# Patient Record
Sex: MALE | Race: OTHER | HISPANIC OR LATINO | Employment: FULL TIME | URBAN - NONMETROPOLITAN AREA
[De-identification: names, ages, dates, MRNs, and addresses within clinical notes are randomized per-mention and may not be internally consistent; named-entity substitution may affect disease eponyms.]

---

## 2020-09-28 ENCOUNTER — HOSPITAL ENCOUNTER (EMERGENCY)
Facility: HOSPITAL | Age: 35
Discharge: HOME | End: 2020-09-28
Attending: EMERGENCY MEDICINE | Admitting: EMERGENCY MEDICINE
Payer: MEDICARE

## 2020-09-28 VITALS
TEMPERATURE: 97.1 F | SYSTOLIC BLOOD PRESSURE: 112 MMHG | HEART RATE: 82 BPM | WEIGHT: 156.31 LBS | OXYGEN SATURATION: 100 % | RESPIRATION RATE: 18 BRPM | DIASTOLIC BLOOD PRESSURE: 65 MMHG

## 2020-09-28 DIAGNOSIS — R51.9 HEADACHE: Primary | ICD-10-CM

## 2020-09-28 LAB
ANION GAP SERPL CALCULATED.3IONS-SCNC: 8 MMOL/L (ref 4–13)
APTT PPP: 27 SECONDS (ref 23–37)
BASOPHILS # BLD AUTO: 0.05 THOUSANDS/ΜL (ref 0–0.1)
BASOPHILS NFR BLD AUTO: 1 % (ref 0–1)
BUN SERPL-MCNC: 11 MG/DL (ref 5–25)
CALCIUM SERPL-MCNC: 8.9 MG/DL (ref 8.3–10.1)
CHLORIDE SERPL-SCNC: 100 MMOL/L (ref 100–108)
CO2 SERPL-SCNC: 32 MMOL/L (ref 21–32)
CREAT SERPL-MCNC: 0.94 MG/DL (ref 0.6–1.3)
CRP SERPL QL: <0.5 MG/L
EOSINOPHIL # BLD AUTO: 0.2 THOUSAND/ΜL (ref 0–0.61)
EOSINOPHIL NFR BLD AUTO: 3 % (ref 0–6)
ERYTHROCYTE [DISTWIDTH] IN BLOOD BY AUTOMATED COUNT: 11.9 % (ref 11.6–15.1)
ERYTHROCYTE [SEDIMENTATION RATE] IN BLOOD: 4 MM/HOUR (ref 0–14)
GFR SERPL CREATININE-BSD FRML MDRD: 105 ML/MIN/1.73SQ M
GLUCOSE SERPL-MCNC: 117 MG/DL (ref 65–140)
HCT VFR BLD AUTO: 45.7 % (ref 36.5–49.3)
HGB BLD-MCNC: 15.8 G/DL (ref 12–17)
IMM GRANULOCYTES # BLD AUTO: 0.02 THOUSAND/UL (ref 0–0.2)
IMM GRANULOCYTES NFR BLD AUTO: 0 % (ref 0–2)
INR PPP: 1.01 (ref 0.84–1.19)
LYMPHOCYTES # BLD AUTO: 2.19 THOUSANDS/ΜL (ref 0.6–4.47)
LYMPHOCYTES NFR BLD AUTO: 31 % (ref 14–44)
MCH RBC QN AUTO: 30.4 PG (ref 26.8–34.3)
MCHC RBC AUTO-ENTMCNC: 34.6 G/DL (ref 31.4–37.4)
MCV RBC AUTO: 88 FL (ref 82–98)
MONOCYTES # BLD AUTO: 0.73 THOUSAND/ΜL (ref 0.17–1.22)
MONOCYTES NFR BLD AUTO: 10 % (ref 4–12)
NEUTROPHILS # BLD AUTO: 3.97 THOUSANDS/ΜL (ref 1.85–7.62)
NEUTS SEG NFR BLD AUTO: 55 % (ref 43–75)
NRBC BLD AUTO-RTO: 0 /100 WBCS
PLATELET # BLD AUTO: 287 THOUSANDS/UL (ref 149–390)
PMV BLD AUTO: 9.4 FL (ref 8.9–12.7)
POTASSIUM SERPL-SCNC: 3.8 MMOL/L (ref 3.5–5.3)
PROTHROMBIN TIME: 13.1 SECONDS (ref 11.6–14.5)
RBC # BLD AUTO: 5.2 MILLION/UL (ref 3.88–5.62)
SODIUM SERPL-SCNC: 140 MMOL/L (ref 136–145)
WBC # BLD AUTO: 7.16 THOUSAND/UL (ref 4.31–10.16)

## 2020-09-28 PROCEDURE — 85610 PROTHROMBIN TIME: CPT | Performed by: PHYSICIAN ASSISTANT

## 2020-09-28 PROCEDURE — 85025 COMPLETE CBC W/AUTO DIFF WBC: CPT | Performed by: PHYSICIAN ASSISTANT

## 2020-09-28 PROCEDURE — 85730 THROMBOPLASTIN TIME PARTIAL: CPT | Performed by: PHYSICIAN ASSISTANT

## 2020-09-28 PROCEDURE — 96374 THER/PROPH/DIAG INJ IV PUSH: CPT

## 2020-09-28 PROCEDURE — 80048 BASIC METABOLIC PNL TOTAL CA: CPT | Performed by: PHYSICIAN ASSISTANT

## 2020-09-28 PROCEDURE — 96361 HYDRATE IV INFUSION ADD-ON: CPT

## 2020-09-28 PROCEDURE — 36415 COLL VENOUS BLD VENIPUNCTURE: CPT | Performed by: PHYSICIAN ASSISTANT

## 2020-09-28 PROCEDURE — 96375 TX/PRO/DX INJ NEW DRUG ADDON: CPT

## 2020-09-28 PROCEDURE — 86140 C-REACTIVE PROTEIN: CPT | Performed by: PHYSICIAN ASSISTANT

## 2020-09-28 PROCEDURE — 99283 EMERGENCY DEPT VISIT LOW MDM: CPT

## 2020-09-28 PROCEDURE — 99285 EMERGENCY DEPT VISIT HI MDM: CPT | Performed by: PHYSICIAN ASSISTANT

## 2020-09-28 PROCEDURE — 85652 RBC SED RATE AUTOMATED: CPT | Performed by: PHYSICIAN ASSISTANT

## 2020-09-28 RX ORDER — DIPHENHYDRAMINE HYDROCHLORIDE 50 MG/ML
25 INJECTION INTRAMUSCULAR; INTRAVENOUS ONCE
Status: COMPLETED | OUTPATIENT
Start: 2020-09-28 | End: 2020-09-28

## 2020-09-28 RX ORDER — KETOROLAC TROMETHAMINE 30 MG/ML
15 INJECTION, SOLUTION INTRAMUSCULAR; INTRAVENOUS ONCE
Status: COMPLETED | OUTPATIENT
Start: 2020-09-28 | End: 2020-09-28

## 2020-09-28 RX ORDER — METOCLOPRAMIDE HYDROCHLORIDE 5 MG/ML
10 INJECTION INTRAMUSCULAR; INTRAVENOUS ONCE
Status: COMPLETED | OUTPATIENT
Start: 2020-09-28 | End: 2020-09-28

## 2020-09-28 RX ADMIN — KETOROLAC TROMETHAMINE 15 MG: 30 INJECTION, SOLUTION INTRAMUSCULAR at 12:50

## 2020-09-28 RX ADMIN — METOCLOPRAMIDE HYDROCHLORIDE 10 MG: 5 INJECTION INTRAMUSCULAR; INTRAVENOUS at 12:50

## 2020-09-28 RX ADMIN — DIPHENHYDRAMINE HYDROCHLORIDE 25 MG: 50 INJECTION, SOLUTION INTRAMUSCULAR; INTRAVENOUS at 12:50

## 2020-09-28 RX ADMIN — SODIUM CHLORIDE 1000 ML: 0.9 INJECTION, SOLUTION INTRAVENOUS at 12:49

## 2022-05-22 ENCOUNTER — HOSPITAL ENCOUNTER (EMERGENCY)
Facility: HOSPITAL | Age: 37
Discharge: HOME | End: 2022-05-22
Attending: EMERGENCY MEDICINE | Admitting: EMERGENCY MEDICINE
Payer: MEDICARE

## 2022-05-22 ENCOUNTER — APPOINTMENT (EMERGENCY)
Dept: RADIOLOGY | Facility: HOSPITAL | Age: 37
End: 2022-05-22
Payer: MEDICARE

## 2022-05-22 VITALS
TEMPERATURE: 97.2 F | WEIGHT: 150.79 LBS | DIASTOLIC BLOOD PRESSURE: 72 MMHG | RESPIRATION RATE: 20 BRPM | HEART RATE: 59 BPM | SYSTOLIC BLOOD PRESSURE: 111 MMHG | OXYGEN SATURATION: 98 %

## 2022-05-22 DIAGNOSIS — T07.XXXA MULTIPLE ABRASIONS: Primary | ICD-10-CM

## 2022-05-22 DIAGNOSIS — T07.XXXA MULTIPLE CONTUSIONS: ICD-10-CM
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
## 2022-05-22 PROCEDURE — 73564 X-RAY EXAM KNEE 4 OR MORE: CPT

## 2022-05-22 PROCEDURE — 73110 X-RAY EXAM OF WRIST: CPT

## 2022-05-22 PROCEDURE — 73080 X-RAY EXAM OF ELBOW: CPT

## 2022-05-22 PROCEDURE — 73610 X-RAY EXAM OF ANKLE: CPT

## 2022-05-22 PROCEDURE — 99284 EMERGENCY DEPT VISIT MOD MDM: CPT | Performed by: EMERGENCY MEDICINE

## 2022-05-22 PROCEDURE — 99284 EMERGENCY DEPT VISIT MOD MDM: CPT
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
## 2024-09-20 ENCOUNTER — HOSPITAL ENCOUNTER (EMERGENCY)
Facility: HOSPITAL | Age: 39
Discharge: HOME | End: 2024-09-20
Attending: EMERGENCY MEDICINE
Payer: MEDICARE

## 2024-09-20 VITALS
TEMPERATURE: 97.7 F | OXYGEN SATURATION: 99 % | DIASTOLIC BLOOD PRESSURE: 97 MMHG | SYSTOLIC BLOOD PRESSURE: 121 MMHG | HEART RATE: 65 BPM | RESPIRATION RATE: 18 BRPM

## 2024-09-20 DIAGNOSIS — K04.7 DENTAL INFECTION: Primary | ICD-10-CM

## 2024-09-20 PROCEDURE — 99284 EMERGENCY DEPT VISIT MOD MDM: CPT | Performed by: EMERGENCY MEDICINE

## 2024-09-20 PROCEDURE — 99282 EMERGENCY DEPT VISIT SF MDM: CPT

## 2024-09-20 RX ORDER — PENICILLIN V POTASSIUM 500 MG/1
500 TABLET, FILM COATED ORAL 4 TIMES DAILY
Qty: 28 TABLET | Refills: 0 | Status: SHIPPED | OUTPATIENT
Start: 2024-09-20 | End: 2024-09-27

## 2024-09-20 RX ORDER — NAPROXEN 500 MG/1
500 TABLET ORAL 2 TIMES DAILY PRN
Qty: 30 TABLET | Refills: 0 | Status: SHIPPED | OUTPATIENT
Start: 2024-09-20

## 2024-09-20 NOTE — ED PROVIDER NOTES
1. Dental infection      ED Disposition       ED Disposition   Discharge    Condition   Stable    Date/Time   Fri Sep 20, 2024 11:44 AM    Comment   Jagdish Wheeler discharge to home/self care.                   Assessment & Plan       Medical Decision Making  Problems Addressed:  Dental infection: self-limited or minor problem    Risk  Prescription drug management.                     Medications - No data to display    History of Present Illness       This is a 39-year-old male presenting the emergency room with chief complaint of right facial swelling and dental pain.  Patient reports that he had the discomfort ongoing for about a week and getting progressively worse.  Pain radiates slightly into his right jaw and behind his right ear.  Has not received any relief from over-the-counter medications.      History provided by:  Patient  Dental Pain  Location:  Upper  Quality:  Aching      Review of Systems        Objective     ED Triage Vitals   Temperature Pulse Blood Pressure Respirations SpO2 Patient Position - Orthostatic VS   09/20/24 1110 09/20/24 1110 09/20/24 1110 09/20/24 1110 09/20/24 1110 09/20/24 1110   97.7 °F (36.5 °C) 65 121/97 18 99 % Sitting      Temp Source Heart Rate Source BP Location FiO2 (%) Pain Score    09/20/24 1110 09/20/24 1110 09/20/24 1110 -- 09/20/24 1145    Temporal Monitor Left arm  7        Physical Exam    Labs Reviewed - No data to display  No orders to display       Procedures    ED Medication and Procedure Management   None     Discharge Medication List as of 9/20/2024 11:46 AM        START taking these medications    Details   naproxen (NAPROSYN) 500 mg tablet Take 1 tablet (500 mg total) by mouth 2 (two) times a day as needed for mild pain or moderate pain, Starting Fri 9/20/2024, Normal      penicillin V potassium (VEETID) 500 mg tablet Take 1 tablet (500 mg total) by mouth 4 (four) times a day for 7 days, Starting Fri 9/20/2024, Until Fri 9/27/2024, Normal                 Michael Amezcua,   09/20/24 1605

## 2024-09-20 NOTE — DISCHARGE INSTRUCTIONS
Here is a list of  dental clinics that may be able to help you.  Keep in mind that these clinics do not have to see you or any other patient.  Also, these clinics are not connected to the [facility] or [facility] but if they agree to see you as a patient it is easy for them to call  Medical Records Department to have your records faxed to them.      [facility]:  [address]   [address]   [facility] [place] [facility] [address]   [phone]     [facility]:   [address]   [address]   [place], [place] [id number] [phone]     [facility]:  [address] [place] [address] [phone]    [facility]:  [address] [place] [address] [phone]    [facility]:  [address],  [place] [address] [phone]    [facility]:  [address] [place] [address]  [phone]    [facility]:  [address] [place] [address]  [phone]    [facility]:  [address] [id number] [phone]    [facility]:  [address] [place], [place] [address] [phone]    [facility]  [address], [place] [address]  [phone]    [facility]:  [address] [place] [id number] [phone]     [facility]:  [address] [place] [address] [phone]       
No lesions; no rash

## 2025-03-10 ENCOUNTER — HOSPITAL ENCOUNTER (EMERGENCY)
Facility: HOSPITAL | Age: 40
Discharge: HOME | End: 2025-03-10
Attending: EMERGENCY MEDICINE
Payer: MEDICARE

## 2025-03-10 ENCOUNTER — APPOINTMENT (EMERGENCY)
Dept: RADIOLOGY | Facility: HOSPITAL | Age: 40
End: 2025-03-10
Payer: MEDICARE

## 2025-03-10 VITALS
SYSTOLIC BLOOD PRESSURE: 130 MMHG | HEART RATE: 68 BPM | OXYGEN SATURATION: 100 % | DIASTOLIC BLOOD PRESSURE: 86 MMHG | RESPIRATION RATE: 16 BRPM | TEMPERATURE: 97.5 F

## 2025-03-10 DIAGNOSIS — S91.339A PUNCTURE WOUND OF FOOT: Primary | ICD-10-CM

## 2025-03-10 PROCEDURE — 73630 X-RAY EXAM OF FOOT: CPT

## 2025-03-10 PROCEDURE — 90715 TDAP VACCINE 7 YRS/> IM: CPT | Performed by: EMERGENCY MEDICINE

## 2025-03-10 PROCEDURE — 90471 IMMUNIZATION ADMIN: CPT

## 2025-03-10 PROCEDURE — 99284 EMERGENCY DEPT VISIT MOD MDM: CPT | Performed by: PHYSICIAN ASSISTANT

## 2025-03-10 PROCEDURE — 99283 EMERGENCY DEPT VISIT LOW MDM: CPT

## 2025-03-10 RX ORDER — CIPROFLOXACIN 500 MG/1
500 TABLET, FILM COATED ORAL 2 TIMES DAILY
Qty: 14 TABLET | Refills: 0 | Status: SHIPPED | OUTPATIENT
Start: 2025-03-10 | End: 2025-03-17

## 2025-03-10 RX ORDER — CIPROFLOXACIN 500 MG/1
500 TABLET, FILM COATED ORAL ONCE
Status: COMPLETED | OUTPATIENT
Start: 2025-03-10 | End: 2025-03-10

## 2025-03-10 RX ADMIN — TETANUS TOXOID, REDUCED DIPHTHERIA TOXOID AND ACELLULAR PERTUSSIS VACCINE, ADSORBED 0.5 ML: 5; 2.5; 8; 8; 2.5 SUSPENSION INTRAMUSCULAR at 12:51

## 2025-03-10 RX ADMIN — CIPROFLOXACIN 500 MG: 500 TABLET ORAL at 13:07

## 2025-03-10 NOTE — DISCHARGE INSTRUCTIONS
Rest, ice, elevate.alterate between tylenol and motrin.   Take antibiotics as prescribed to prevent infection  Follow up with your PCP and return with any new or worsening symptoms

## 2025-03-10 NOTE — Clinical Note
Jagdish Wheeler was seen and treated in our emergency department on 3/10/2025.                Diagnosis:     Jagdish  may return to work on return date.    He may return on this date: 03/11/2025         If you have any questions or concerns, please don't hesitate to call.      Marielena Blake PA-C    ______________________________           _______________          _______________  Hospital Representative                              Date                                Time

## 2025-03-10 NOTE — ED PROVIDER NOTES
Time reflects when diagnosis was documented in both MDM as applicable and the Disposition within this note       Time User Action Codes Description Comment    3/10/2025  1:00 PM JulissaMarielena zimmerman Add [S91.339A] Puncture wound of foot           ED Disposition       ED Disposition   Discharge    Condition   Stable    Date/Time   Mon Mar 10, 2025  1:00 PM    Comment   Jagdish Wheeler discharge to home/self care.                   Assessment & Plan       Medical Decision Making  39-year-old male presents to the emergency department for evaluation status post nail puncture wound into the right great toe.  Vitals and medical record reviewed.  Patient at risk for the following but not limited to wound, cellulitis, embedded foreign body.  Sickle exam was consistent with puncture wound.  No palpable or visible foreign bodies.  ED interpretation of x-ray is negative for evidence of foreign body.  Patient was treated with tetanus, antibiotics and symptomatic treatments.  We discussed strict return precautions appropriate follow-up and patient verbalized understanding.  He is clinically and hemodynamically stable for discharge    Problems Addressed:  Puncture wound of foot: acute illness or injury    Amount and/or Complexity of Data Reviewed  Radiology: ordered.    Risk  Prescription drug management.             Medications   tetanus-diphtheria-acellular pertussis (BOOSTRIX) IM injection 0.5 mL (0.5 mL Intramuscular Given 3/10/25 1251)   ciprofloxacin (CIPRO) tablet 500 mg (500 mg Oral Given 3/10/25 1307)       ED Risk Strat Scores                                                History of Present Illness       Chief Complaint   Patient presents with    Foot Injury     Patient stepped on a nail injuring his right foot last night. Patient is not UTD with tetanus.        Past Medical History:   Diagnosis Date    Bipolar 1 disorder (HCC)     Depression     Frequent headaches     Psychiatric disorder       History reviewed. No  pertinent surgical history.   History reviewed. No pertinent family history.   Social History     Tobacco Use    Smoking status: Never    Smokeless tobacco: Never   Vaping Use    Vaping status: Never Used   Substance Use Topics    Alcohol use: Yes    Drug use: Not Currently      E-Cigarette/Vaping    E-Cigarette Use Never User       E-Cigarette/Vaping Substances      I have reviewed and agree with the history as documented.     39-year-old male presents to the emergency department for evaluation of right foot injury.  Patient states last night he stepped on a plank that had a nail on it.  States the nail went through his boot and into the bottom part of his right great toe.  States now he has discomfort in the area and some mild swelling.  States nail is intact.  Unknown last tetanus        Review of Systems   Constitutional: Negative.    Respiratory: Negative.     Cardiovascular: Negative.    Musculoskeletal: Negative.    Skin:  Positive for wound.   Neurological: Negative.    All other systems reviewed and are negative.          Objective       ED Triage Vitals [03/10/25 1105]   Temperature Pulse Blood Pressure Respirations SpO2 Patient Position - Orthostatic VS   97.5 °F (36.4 °C) 68 130/86 16 100 % Sitting      Temp Source Heart Rate Source BP Location FiO2 (%) Pain Score    Temporal Monitor Left arm -- --      Vitals      Date and Time Temp Pulse SpO2 Resp BP Pain Score FACES Pain Rating User   03/10/25 1105 97.5 °F (36.4 °C) 68 100 % 16 130/86 -- -- AFG            Physical Exam  Vitals and nursing note reviewed.   Constitutional:       General: He is not in acute distress.     Appearance: Normal appearance. He is not ill-appearing, toxic-appearing or diaphoretic.   HENT:      Head: Normocephalic.   Eyes:      Conjunctiva/sclera: Conjunctivae normal.   Pulmonary:      Effort: Pulmonary effort is normal.   Skin:     General: Skin is warm and dry.      Comments: Pinpoint puncture wound to the plantar aspect of  the right great toe.  No palpable or visible foreign bodies   Neurological:      General: No focal deficit present.      Mental Status: He is alert.         Results Reviewed       None            XR foot 3+ views RIGHT   Final Interpretation by Richard Mcdaniel MD (03/10 1441)      No evidence of fracture or radiopaque foreign body.         Computerized Assisted Algorithm (CAA) may have been used to analyze all applicable images.         Workstation performed: [id number]             Procedures    ED Medication and Procedure Management   Prior to Admission Medications   Prescriptions Last Dose Informant Patient Reported? Taking?   naproxen (NAPROSYN) 500 mg tablet   No No   Sig: Take 1 tablet (500 mg total) by mouth 2 (two) times a day as needed for mild pain or moderate pain      Facility-Administered Medications: None     Discharge Medication List as of 3/10/2025  1:02 PM        START taking these medications    Details   ciprofloxacin (CIPRO) 500 mg tablet Take 1 tablet (500 mg total) by mouth 2 (two) times a day for 7 days, Starting Mon 3/10/2025, Until Mon 3/17/2025, Normal           CONTINUE these medications which have NOT CHANGED    Details   naproxen (NAPROSYN) 500 mg tablet Take 1 tablet (500 mg total) by mouth 2 (two) times a day as needed for mild pain or moderate pain, Starting Fri 9/20/2024, Normal           No discharge procedures on file.  ED SEPSIS DOCUMENTATION   Time reflects when diagnosis was documented in both MDM as applicable and the Disposition within this note       Time User Action Codes Description Comment    3/10/2025  1:00 PM Marielena Blake Add [S91.339A] Puncture wound of foot                  Marielena Blake PA-C  03/10/25 190

## 2025-04-17 ENCOUNTER — APPOINTMENT (OUTPATIENT)
Dept: LAB | Facility: CLINIC | Age: 40
End: 2025-04-17
Payer: MEDICARE

## 2025-04-17 ENCOUNTER — OFFICE VISIT (OUTPATIENT)
Dept: FAMILY MEDICINE CLINIC | Facility: CLINIC | Age: 40
End: 2025-04-17
Payer: MEDICARE

## 2025-04-17 VITALS
TEMPERATURE: 97.8 F | WEIGHT: 182.1 LBS | HEIGHT: 71 IN | BODY MASS INDEX: 25.49 KG/M2 | OXYGEN SATURATION: 98 % | HEART RATE: 90 BPM | DIASTOLIC BLOOD PRESSURE: 65 MMHG | SYSTOLIC BLOOD PRESSURE: 123 MMHG

## 2025-04-17 DIAGNOSIS — Z76.89 ENCOUNTER TO ESTABLISH CARE: ICD-10-CM

## 2025-04-17 DIAGNOSIS — F31.9 BIPOLAR DEPRESSION (HCC): ICD-10-CM

## 2025-04-17 DIAGNOSIS — Z13.9 SCREENING DUE: ICD-10-CM

## 2025-04-17 DIAGNOSIS — F31.9 BIPOLAR 1 DISORDER, DEPRESSED (HCC): ICD-10-CM

## 2025-04-17 DIAGNOSIS — F41.1 GAD (GENERALIZED ANXIETY DISORDER): ICD-10-CM

## 2025-04-17 DIAGNOSIS — Z91.148 NONADHERENCE TO MEDICATION: ICD-10-CM

## 2025-04-17 DIAGNOSIS — F33.2 MODERATELY SEVERE RECURRENT MAJOR DEPRESSION (HCC): ICD-10-CM

## 2025-04-17 DIAGNOSIS — R10.31 RIGHT GROIN PAIN: Primary | ICD-10-CM

## 2025-04-17 DIAGNOSIS — M76.891 HIP FLEXOR TENDONITIS, RIGHT: ICD-10-CM

## 2025-04-17 LAB
ALBUMIN SERPL BCG-MCNC: 4.3 G/DL (ref 3.5–5)
ALP SERPL-CCNC: 101 U/L (ref 34–104)
ALT SERPL W P-5'-P-CCNC: 32 U/L (ref 7–52)
ANION GAP SERPL CALCULATED.3IONS-SCNC: 9 MMOL/L (ref 4–13)
AST SERPL W P-5'-P-CCNC: 24 U/L (ref 13–39)
BILIRUB SERPL-MCNC: 0.74 MG/DL (ref 0.2–1)
BUN SERPL-MCNC: 12 MG/DL (ref 5–25)
CALCIUM SERPL-MCNC: 9.3 MG/DL (ref 8.4–10.2)
CHLORIDE SERPL-SCNC: 99 MMOL/L (ref 96–108)
CHOLEST SERPL-MCNC: 229 MG/DL (ref ?–200)
CO2 SERPL-SCNC: 28 MMOL/L (ref 21–32)
CREAT SERPL-MCNC: 0.99 MG/DL (ref 0.6–1.3)
GFR SERPL CREATININE-BSD FRML MDRD: 96 ML/MIN/1.73SQ M
GLUCOSE P FAST SERPL-MCNC: 97 MG/DL (ref 65–99)
HDLC SERPL-MCNC: 44 MG/DL
LDLC SERPL CALC-MCNC: 147 MG/DL (ref 0–100)
NONHDLC SERPL-MCNC: 185 MG/DL
POTASSIUM SERPL-SCNC: 4.1 MMOL/L (ref 3.5–5.3)
PROT SERPL-MCNC: 7.8 G/DL (ref 6.4–8.4)
SODIUM SERPL-SCNC: 136 MMOL/L (ref 135–147)
TRIGL SERPL-MCNC: 188 MG/DL (ref ?–150)

## 2025-04-17 PROCEDURE — 99204 OFFICE O/P NEW MOD 45 MIN: CPT | Performed by: PHYSICIAN ASSISTANT

## 2025-04-17 PROCEDURE — 80061 LIPID PANEL: CPT

## 2025-04-17 PROCEDURE — 36415 COLL VENOUS BLD VENIPUNCTURE: CPT

## 2025-04-17 PROCEDURE — 80053 COMPREHEN METABOLIC PANEL: CPT
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
## 2025-04-21 ENCOUNTER — EVALUATION (OUTPATIENT)
Dept: PHYSICAL THERAPY | Facility: CLINIC | Age: 40
End: 2025-04-21
Attending: PHYSICIAN ASSISTANT
Payer: MEDICARE

## 2025-04-21 DIAGNOSIS — R10.31 RIGHT GROIN PAIN: ICD-10-CM

## 2025-04-21 PROCEDURE — 97110 THERAPEUTIC EXERCISES: CPT | Performed by: PHYSICAL THERAPIST

## 2025-04-21 PROCEDURE — 97140 MANUAL THERAPY 1/> REGIONS: CPT | Performed by: PHYSICAL THERAPIST

## 2025-04-21 PROCEDURE — 97161 PT EVAL LOW COMPLEX 20 MIN: CPT | Performed by: PHYSICAL THERAPIST
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
## 2025-04-24 ENCOUNTER — OFFICE VISIT (OUTPATIENT)
Dept: PHYSICAL THERAPY | Facility: CLINIC | Age: 40
End: 2025-04-24
Attending: PHYSICIAN ASSISTANT
Payer: MEDICARE

## 2025-04-24 DIAGNOSIS — R10.31 RIGHT GROIN PAIN: Primary | ICD-10-CM

## 2025-04-24 PROCEDURE — 97110 THERAPEUTIC EXERCISES: CPT

## 2025-04-24 PROCEDURE — 97140 MANUAL THERAPY 1/> REGIONS: CPT

## 2025-04-24 NOTE — PROGRESS NOTES
"Daily Note     Today's date: 2025  Patient name: Jagdish Wheeler  : 1985  MRN: [id number]  Referring provider: Yudelka Burris*  Dx:   Encounter Diagnosis     ICD-10-CM    1. Right groin pain  R10.31                      Subjective: Patient reports R groin/hip was very sore after evaluation. He notes he completed HEP the following day and this was tolerable other than BKFO. He notes pain is worst once he sits down in the evening after his activity. He also gets increased pain with sudden movements such as getting out of the car and navigating stairs.       Objective: See treatment diary below      Assessment: Tolerated treatment fair. He demonstrated increased pain with hip flexion of all degrees and very had difficulty tolerated hook lying position. He noted relief with LTRs utilizing small range. Patient required moderate verbal cues to perform exercises with appropriate technique and intensity. Patient would benefit from continued PT to increase R hip mobility and eventually strength for improved function in ADLs.      Plan: Continue per plan of care.      Precautions: none     Daily Treatment Diary:      Initial Evaluation Date:25  Compliance                    Visit Number 1 2                   Re-Eval  IE                 MC   Foto Captured Y                                              Manual                      STM Ant. Hip 10 min  Ant. Hip 10 min                   Hip PROM 5 min flex/ IR 5m flex/IR                                         Ther-Ex                      QS/GS 5\" x10 5\"x15                   Heel slide x10 x10                   SAQ -                     SLR -                     Hip abd -                     Adduction squeeze - 5\"x10                   Supine clamshell - OTB 5\"x10                   Supine bridge -                     BKFO x10  np                   LTR x10  x10                   Neuro Re-Ed                      Side step                 "      march             NBOS -reach/ foam                                       Ther-Act              gait train                      Stair train                           Modalities                      HP Pre 8 min Pre 8 min

## 2025-04-28 ENCOUNTER — OFFICE VISIT (OUTPATIENT)
Dept: PHYSICAL THERAPY | Facility: CLINIC | Age: 40
End: 2025-04-28
Attending: PHYSICIAN ASSISTANT
Payer: MEDICARE

## 2025-04-28 DIAGNOSIS — R10.31 RIGHT GROIN PAIN: Primary | ICD-10-CM

## 2025-04-28 PROCEDURE — 97110 THERAPEUTIC EXERCISES: CPT

## 2025-04-28 PROCEDURE — 97140 MANUAL THERAPY 1/> REGIONS: CPT

## 2025-04-28 NOTE — PROGRESS NOTES
"Daily Note     Today's date: 2025  Patient name: Jagdish Wheeler  : 1985  MRN: [id number]  Referring provider: Yudelka Burris*  Dx:   Encounter Diagnosis     ICD-10-CM    1. Right groin pain  R10.31                      Subjective: Patient reports pain continues to be worst when he sits down to rest after activity. Also going up stairs and getting into his car. He notes less severity and frequency of pain in general though- including with driving.      Objective: See treatment diary below      Assessment: Tolerated treatment fair. Improved tolerance to R hip flexion today, although this continues to aggravate \"pinching\" and \"grinding\" sensation with this. Patient would benefit from continued PT to increase R hip mobility and strength fro improved function in ADLs.      Plan: Continue per plan of care.      Precautions: none     Daily Treatment Diary:      Initial Evaluation Date:25  Compliance                  Visit Number 1 2  3                 Re-Eval  IE                 MC   Foto Captured Y                                            Manual                      STM Ant. Hip 10 min  Ant. Hip 10 min Ant. Hip 10 min                 Hip PROM 5 min flex/ IR 5m flex/IR 5m gentle                                       Ther-Ex                      QS/GS 5\" x10 5\"x15 5\"x15                 Heel slide x10 x10 x15                 SAQ -   5\"x10                 SLR -   X6 !inc pain                 Hip abd -   Slide board 10x                 Adduction squeeze - 5\"x10 5\"x10                 Supine clamshell - OTB 5\"x10 OTB 5\"X10                 Supine bridge -                     BKFO x10  np held                 LTR x10  x10 held                 Neuro Re-Ed                      Side step                      march             NBOS -reach/ foam                                       Ther-Act              gait train                      Stair train                           " Modalities                      HP Pre 8 min Pre 8 min Pre 8 min

## 2025-04-30 ENCOUNTER — OFFICE VISIT (OUTPATIENT)
Dept: PHYSICAL THERAPY | Facility: CLINIC | Age: 40
End: 2025-04-30
Attending: PHYSICIAN ASSISTANT
Payer: MEDICARE

## 2025-04-30 DIAGNOSIS — R10.31 RIGHT GROIN PAIN: Primary | ICD-10-CM

## 2025-04-30 PROCEDURE — 97112 NEUROMUSCULAR REEDUCATION: CPT

## 2025-04-30 PROCEDURE — 97110 THERAPEUTIC EXERCISES: CPT

## 2025-04-30 NOTE — PROGRESS NOTES
"Daily Note     Today's date: 2025  Patient name: Jagdish Wheeler  : 1985  MRN: [id number]  Referring provider: Yudelka Burris*  Dx:   Encounter Diagnosis     ICD-10-CM    1. Right groin pain  R10.31                      Subjective: Patient reports R hip/groin pain persists although he does not have as much constant pain anymore. He says he continues to have the sharp pain at rest following activity.      Objective: See treatment diary below      Assessment: Tolerated treatment well. Patient required moderate verbal cues to perform exercises with appropriate technique and intensity. Patient would benefit from continued PT to increase R hip mobility and strength for improved function in ADLs.      Plan: Continue per plan of care.      Precautions: none     Daily Treatment Diary:      Initial Evaluation Date:25  Compliance                Visit Number 1 2  3 4               Re-Eval  IE                 MC   Foto Captured Y                                          Manual                      STM Ant. Hip 10 min  Ant. Hip 10 min Ant. Hip 10 min -               Hip PROM 5 min flex/ IR 5m flex/IR 5m gentle 8m gentle                                     Ther-Ex                      QS/GS 5\" x10 5\"x15 5\"x15 5\"x20               Heel slide x10 x10 x15 x15               SAQ -   5\"x10 5\"x15               SLR -   X6 !inc pain  x7// SL abd 10x               Hip abd -   Slide board 10x Slide board 15x               Adduction squeeze - 5\"x10 5\"x10 5\"x15               Supine clamshell - OTB 5\"x10 OTB 5\"X10 GTB 5\"x10               Supine bridge -                     BKFO x10  np held                 LTR x10  x10 held                 Neuro Re-Ed                      Side step                      march             NBOS -reach/ foam                                       Ther-Act              gait train                      Stair train                           Modalities    "                   HP Pre 8 min Pre 8 min Pre 8 min Pre 8 min

## 2025-05-06 ENCOUNTER — OFFICE VISIT (OUTPATIENT)
Dept: PHYSICAL THERAPY | Facility: CLINIC | Age: 40
End: 2025-05-06
Attending: PHYSICIAN ASSISTANT
Payer: MEDICARE

## 2025-05-06 DIAGNOSIS — R10.31 RIGHT GROIN PAIN: Primary | ICD-10-CM

## 2025-05-06 PROCEDURE — 97112 NEUROMUSCULAR REEDUCATION: CPT | Performed by: PHYSICAL THERAPIST

## 2025-05-06 PROCEDURE — 97110 THERAPEUTIC EXERCISES: CPT | Performed by: PHYSICAL THERAPIST

## 2025-05-06 NOTE — PROGRESS NOTES
"Daily Note     Today's date: 2025  Patient name: Jagdish Wheeler  : 1985  MRN: [id number]  Referring provider: Yudelka Burris*  Dx:   Encounter Diagnosis     ICD-10-CM    1. Right groin pain  R10.31                      Subjective: Patient notes improving pain symptoms in the right hip.      Objective: See treatment diary below      Assessment: Tolerated treatment well. Patient would benefit from continued PT. Patient is able to progress to use of the upright bike with good tolerance this date. Progressing with ROM and strength with decreasing pain complaints.       Plan: Continue per plan of care.      Precautions: none     Daily Treatment Diary:      Initial Evaluation Date:25  Compliance              Visit Number 1 2  3 4  5             Re-Eval  IE                 MC   Foto Captured Y                                        Manual                      STM Ant. Hip 10 min  Ant. Hip 10 min Ant. Hip 10 min -  ant hip 5 min             Hip PROM 5 min flex/ IR 5m flex/IR 5m gentle 8m gentle  8 m                                   Ther-Ex                      QS/GS 5\" x10 5\"x15 5\"x15 5\"x20  5\" x20             Heel slide x10 x10 x15 x15  x15             SAQ -   5\"x10 5\"x15  5\" x15             SLR -   X6 !inc pain  x7// SL abd 10x  x10// SL abd 10x             Hip abd -   Slide board 10x Slide board 15x  -             Adduction squeeze - 5\"x10 5\"x10 5\"x15  5\" x20             Supine clamshell - OTB 5\"x10 OTB 5\"X10 GTB 5\"x10  GTB 5\" x15             Supine bridge -                     BKFO x10  np held                 Upright Bike     5 min        LTR x10  x10 held                 Neuro Re-Ed                      Side step                      march             NBOS -reach/ foam                                       Ther-Act              gait train                      Stair train                           Modalities                      HP Pre 8 min " Pre 8 min Pre 8 min Pre 8 min -

## 2025-05-08 ENCOUNTER — OFFICE VISIT (OUTPATIENT)
Dept: PHYSICAL THERAPY | Facility: CLINIC | Age: 40
End: 2025-05-08
Attending: PHYSICIAN ASSISTANT
Payer: MEDICARE

## 2025-05-08 DIAGNOSIS — R10.31 RIGHT GROIN PAIN: Primary | ICD-10-CM

## 2025-05-08 PROCEDURE — 97110 THERAPEUTIC EXERCISES: CPT

## 2025-05-08 PROCEDURE — 97140 MANUAL THERAPY 1/> REGIONS: CPT

## 2025-05-08 NOTE — PROGRESS NOTES
"Daily Note     Today's date: 2025  Patient name: Jagdish Wheeler  : 1985  MRN: [id number]  Referring provider: Yudelka Burris*  Dx:   Encounter Diagnosis     ICD-10-CM    1. Right groin pain  R10.31                      Subjective: Patient reports R hip is stiff and tender this am. He feels severity and frequency of sharp \"grinding\" pain has decreased but is not resolved.      Objective: See treatment diary below      Assessment: Tolerated treatment well. Patient required moderate verbal cues to perform exercises with appropriate technique and intensity. Patient would benefit from continued PT to increase R hip strength and mobility for improved function in ADLs.      Plan: Continue per plan of care.      Precautions: none     Daily Treatment Diary:      Initial Evaluation Date:25  Compliance   5/           Visit Number 1 2  3 4  5 6           Re-Eval  IE                 MC   Foto Captured Y                                      Manual                      STM Ant. Hip 10 min  Ant. Hip 10 min Ant. Hip 10 min -  ant hip 5 min Ant hip 5m           Hip PROM 5 min flex/ IR 5m flex/IR 5m gentle 8m gentle  8 m 8m                                 Ther-Ex                      QS/GS 5\" x10 5\"x15 5\"x15 5\"x20  5\" x20 5\"x20           Heel slide x10 x10 x15 x15  x15 x20           SAQ -   5\"x10 5\"x15  5\" x15 5\"x15           SLR -   X6 !inc pain  x7// SL abd 10x  x10// SL abd 10x  x10// SL abd 10x           Hip abd -   Slide board 10x Slide board 15x  -             Adduction squeeze - 5\"x10 5\"x10 5\"x15  5\" x20 5\"x20           Supine clamshell - OTB 5\"x10 OTB 5\"X10 GTB 5\"x10  GTB 5\" x15 GTB 5\"x20           Supine bridge -                     BKFO x10  np held                 Upright Bike     5 min 5m       LTR x10  x10 held                 Neuro Re-Ed                      Side step                      march             NBOS -reach/ foam                   "                     Ther-Act              gait train                      Stair train                           Modalities                      HP Pre 8 min Pre 8 min Pre 8 min Pre 8 min -

## 2025-05-12 ENCOUNTER — APPOINTMENT (OUTPATIENT)
Dept: PHYSICAL THERAPY | Facility: CLINIC | Age: 40
End: 2025-05-12
Attending: PHYSICIAN ASSISTANT
Payer: MEDICARE

## 2025-05-13 RX ORDER — OXYCODONE AND ACETAMINOPHEN 5; 325 MG/1; MG/1
TABLET ORAL
COMMUNITY
Start: 2025-05-12

## 2025-05-13 RX ORDER — CEPHALEXIN 500 MG/1
CAPSULE ORAL
COMMUNITY
Start: 2025-05-12

## 2025-05-15 ENCOUNTER — OFFICE VISIT (OUTPATIENT)
Dept: FAMILY MEDICINE CLINIC | Facility: CLINIC | Age: 40
End: 2025-05-15
Payer: MEDICARE

## 2025-05-15 ENCOUNTER — APPOINTMENT (OUTPATIENT)
Dept: PHYSICAL THERAPY | Facility: CLINIC | Age: 40
End: 2025-05-15
Attending: PHYSICIAN ASSISTANT
Payer: MEDICARE

## 2025-05-15 VITALS
BODY MASS INDEX: 25.21 KG/M2 | OXYGEN SATURATION: 98 % | SYSTOLIC BLOOD PRESSURE: 109 MMHG | TEMPERATURE: 98.1 F | DIASTOLIC BLOOD PRESSURE: 70 MMHG | HEART RATE: 76 BPM | WEIGHT: 180.78 LBS

## 2025-05-15 DIAGNOSIS — S81.811S: ICD-10-CM

## 2025-05-15 DIAGNOSIS — M76.891 HIP FLEXOR TENDONITIS, RIGHT: ICD-10-CM

## 2025-05-15 DIAGNOSIS — F31.9 BIPOLAR 1 DISORDER, DEPRESSED (HCC): ICD-10-CM

## 2025-05-15 DIAGNOSIS — F51.01 PRIMARY INSOMNIA: ICD-10-CM

## 2025-05-15 DIAGNOSIS — F41.1 GAD (GENERALIZED ANXIETY DISORDER): ICD-10-CM

## 2025-05-15 DIAGNOSIS — Z00.00 MEDICARE ANNUAL WELLNESS VISIT, INITIAL: Primary | ICD-10-CM

## 2025-05-15 DIAGNOSIS — R26.9 GAIT DISTURBANCE: ICD-10-CM

## 2025-05-15 DIAGNOSIS — F33.1 MODERATE RECURRENT MAJOR DEPRESSION (HCC): ICD-10-CM

## 2025-05-15 PROCEDURE — 99213 OFFICE O/P EST LOW 20 MIN: CPT | Performed by: PHYSICIAN ASSISTANT

## 2025-05-15 PROCEDURE — G0438 PPPS, INITIAL VISIT: HCPCS | Performed by: PHYSICIAN ASSISTANT

## 2025-05-15 PROCEDURE — G2211 COMPLEX E/M VISIT ADD ON: HCPCS | Performed by: PHYSICIAN ASSISTANT

## 2025-05-15 NOTE — PROGRESS NOTES
Name: Jagdish Wheeler      : 1985      MRN: [id number]  Encounter Provider: Yudelka Burris PA-C  Encounter Date: 5/15/2025   Encounter department: [facility] PRIMARY CARE  :  Assessment & Plan  Medicare annual wellness visit, initial  Medicare annual wellness visit done today.  No previous documentation that this has ever been done.  Visual acuity performed.  Patient had 3 out of 3 word recall.  Was able to spell the word world forwards and backwards.  Able to interpret proverbs.  Oriented x 3.  Admits to having anxiety and depression related to his bipolar but not needing treatment at this time.       Laceration of calf, right, sequela  On 2025, patient was in her home that was having some remodeling done.  A large piece of metal impacted his right calf.  Was seen in [facility] emergency department.  He had a gash in his leg all the way through the muscle exposing fascia.  He was treated by nurse practitioner with several layer closure along with irrigation.  10 cm curvilinear laceration was noted.  He was given Keflex and he is to return on 525 for removal of sutures.  He is continuing his 10 days of Keflex.  Not having any fever or chills.  No drainage from the laceration.  He did show me a picture of the curvilinear laceration of the right calf and fascia was exposed.       Bipolar 1 disorder, depressed (HCC)  History of bipolar 1 disorder.  Had been getting psychotropic medications.  He states that he had legal issues with one of his sons and that his son was an escape risk and he had to be fully alert to be able to go out and capture his son if his son would run away.  He then stopped the medications because it caused some increased lassitude.  He has not needed the treatment for bipolar since that time.  His PHQ score was 13 showing he does have depression and he tested also positive for anxiety.       Primary insomnia  Patient states that he has trouble getting  to sleep and staying asleep.  He typically sleeps 3 hours per night.  He has not had any manic or hypomanic episodes recently.  He is not taking anything for his insomnia and feels that if he can just relax and get rid of the thoughts in his mind that he would be able to be helped for the insomnia.       LULU (generalized anxiety disorder)  His LULU-7 score increased from 10-17.  He is developing resiliency in dealing with his anxiety.       Moderate recurrent major depression (HCC)  Depression Screening Follow-up Plan: Patient's depression screening was positive with a PHQ-2 score of 4. Their PHQ-9 score was 13. Patient assessed for underlying major depression. They have no active suicidal ideations. Brief counseling provided and recommend additional follow-up/re-evaluation next office visit.  Patient had been attending counseling and he cannot remember why he is no longer going that he does not feel the need to continue with counseling and he is dealing with his depression and anxiety on his own.       Hip flexor tendonitis, right  Patient had pain in the anterior right hip consistent with hip flexor tendinitis.  He has been attending physical therapy and felt that he is making progress.  He is now using a cane for ambulation secondary to the laceration in his right calf.       Gait disturbance            Preventive health issues were discussed with patient, and age appropriate screening tests were ordered as noted in patient's After Visit Summary. Personalized health advice and appropriate referrals for health education or preventive services given if needed, as noted in patient's After Visit Summary.    History of Present Illness     HPI: 39-year-old male seen for his initial Medicare wellness visit.  His previous provider had not completed this in the past.  Mental state was appropriate.  He does have depression anxiety and bipolar history but does not need medicine at this time.  Has been attending counseling  sessions in the local area but does not remember why he stopped going.  He does not feel the need for additional counseling sessions at this time.    He is the father of 8 children.  Age ranges from 4-22.  The 4 youngest children are in the same house.  He has been with the same mother of these 4 children for the last 16 years.  He states that he is worried a lot about many different things and things that he should not even be concerned about.    He is not having pain in his chest heart palpitations dizziness or lightheadedness.  He continues to have some right hip pain.  He states that he has tripped and fallen in the past but they are working on his gait and physical therapy.    Recent laceration on 5/11/2025 from a piece of metal that occurred during a home remodeling project.  He was seen in [facility] and he showed me a picture of the laceration that went all the way to fascia.  He had 2 layer closure performed and will get his other sutures removed in 2 weeks from the date of the accident.  He is not having any fever chills or drainage from the affected site.  He is using a cane to help with his ambulation.  Patient Care Team:  Libra Hernandez DO as PCP - General (Family Medicine)    Review of Systems: No recent URI or viral syndrome.  Medical History Reviewed by provider this encounter:       Annual Wellness Visit Questionnaire       Health Risk Assessment:   Patient rates overall health as fair. Patient feels that their physical health rating is slightly worse. Patient is satisfied with their life. Eyesight was rated as same. Hearing was rated as same. Patient feels that their emotional and mental health rating is same. Patients states they are sometimes angry. Patient states they are often unusually tired/fatigued. Pain experienced in the last 7 days has been a lot. Patient's pain rating has been 8/10. Patient states that he has experienced weight loss or gain in last 6 months. Gained about 10 lbs  without trying     Depression Screening:   PHQ-2 Score: 4  PHQ-9 Score: 13      Fall Risk Screening:   In the past year, patient has experienced: history of falling in past year    Number of falls: 2 or more  Injured during fall?: No    Feels unsteady when standing or walking?: Yes    Worried about falling?: No      Home Safety:  Patient does not have trouble with stairs inside or outside of their home. Patient has working smoke alarms and has working carbon monoxide detector. Home safety hazards include: none.     Nutrition:   Current diet is Regular.     Medications:   Patient is not currently taking any over-the-counter supplements. Patient is able to manage medications.     Activities of Daily Living (ADLs)/Instrumental Activities of Daily Living (IADLs):   Walk and transfer into and out of bed and chair?: Yes  Dress and groom yourself?: Yes    Bathe or shower yourself?: Yes    Feed yourself? Yes  Do your laundry/housekeeping?: Yes  Manage your money, pay your bills and track your expenses?: Yes  Make your own meals?: Yes    Do your own shopping?: Yes    Previous Hospitalizations:   Any hospitalizations or ED visits within the last 12 months?: Yes      Hospitalization Comments: ER 5/11/2025 for an injury resulting in a laceration on right calf.     Preventive Screenings      Cardiovascular Screening:    General: Screening Current      Diabetes Screening:     General: Screening Current      Prostate Cancer Screening:    General: Screening Not Indicated      Lung Cancer Screening:     General: Screening Not Indicated    Screening, Brief Intervention, and Referral to Treatment (SBIRT)     Screening    Typical number of drinks in a week: 6    Single Item Drug Screening:  How often have you used an illegal drug (including marijuana) or a prescription medication for non-medical reasons in the past year? never    Single Item Drug Screen Score: 0  Interpretation: Negative screen for possible drug use disorder    Review  of Current Opioid Use    Opioid Risk Tool (ORT) Interpretation: Complete Opioid Risk Tool (ORT)  Patient presently on opioids as a result of the trauma to his leg on 5/11/2025.  No long-term use of opiates noted on his PDMP results.     No results found.    Objective   There were no vitals taken for this visit.    Physical Exam: Reviewed vital signs.  /70.  He is afebrile.    Heart is regular rate without murmur rub or gallop.  Lungs are clear to auscultation.    Dressing intact to the right leg laceration from 5/11/2015.  Administrative Statements   I have spent a total time of 45 minutes in caring for this patient on the day of the visit/encounter including Diagnostic results, Prognosis, Risks and benefits of tx options, Patient and family education, Impressions, Counseling / Coordination of care, Documenting in the medical record, Reviewing/placing orders in the medical record (including tests, medications, and/or procedures), and Obtaining or reviewing history      He went over his lipid profile and he has only a 1.6% ASCVD risk.  CMP is normal.  I will see him in 6 months to assess his bipolar, depression, and anxiety..

## 2025-05-15 NOTE — PROGRESS NOTES
Depression Screening Follow-up Plan: Patient's depression screening was positive with a PHQ-2 score of 4. Their PHQ-9 score was 13. Patient assessed for underlying major depression. They have no active suicidal ideations. Brief counseling provided and recommend additional follow-up/re-evaluation next office visit.

## 2025-05-15 NOTE — PATIENT INSTRUCTIONS
Medicare Preventive Visit Patient Instructions  Thank you for completing your Welcome to Medicare Visit or Medicare Annual Wellness Visit today. Your next wellness visit will be due in one year (5/16/2026).  The screening/preventive services that you may require over the next 5-10 years are detailed below. Some tests may not apply to you based off risk factors and/or age. Screening tests ordered at today's visit but not completed yet may show as past due. Also, please note that scanned in results may not display below.  Preventive Screenings:  Service Recommendations Previous Testing/Comments   Colorectal Cancer Screening  Colonoscopy    Fecal Occult Blood Test (FOBT)/Fecal Immunochemical Test (FIT)  Fecal DNA/Cologuard Test  Flexible Sigmoidoscopy Age: 45-75 years old   Colonoscopy: every 10 years (May be performed more frequently if at higher risk)  OR  FOBT/FIT: every 1 year  OR  Cologuard: every 3 years  OR  Sigmoidoscopy: every 5 years  Screening may be recommended earlier than age 45 if at higher risk for colorectal cancer. Also, an individualized decision between you and your healthcare provider will decide whether screening between the ages of 76-85 would be appropriate. Colonoscopy: Not on file  FOBT/FIT: Not on file  Cologuard: Not on file  Sigmoidoscopy: Not on file          Prostate Cancer Screening Individualized decision between patient and health care provider in men between ages of 55-69   Medicare will cover every 12 months beginning on the day after your 50th birthday PSA: No results in last 5 years     Screening Not Indicated     Hepatitis C Screening Once for adults born between 1945 and 1965  More frequently in patients at high risk for Hepatitis C Hep C Antibody: Not on file        Diabetes Screening 1-2 times per year if you're at risk for diabetes or have pre-diabetes Fasting glucose: 97 mg/dL (4/17/2025)  A1C: No results in last 5 years (No results in last 5 years)  Screening Current    Cholesterol Screening Once every 5 years if you don't have a lipid disorder. May order more often based on risk factors. Lipid panel: 04/17/2025  Screening Current      Other Preventive Screenings Covered by Medicare:  Abdominal Aortic Aneurysm (AAA) Screening: covered once if your at risk. You're considered to be at risk if you have a family history of AAA or a male between the age of 65-75 who smoking at least 100 cigarettes in your lifetime.  Lung Cancer Screening: covers low dose CT scan once per year if you meet all of the following conditions: (1) Age 55-77; (2) No signs or symptoms of lung cancer; (3) Current smoker or have quit smoking within the last 15 years; (4) You have a tobacco smoking history of at least 20 pack years (packs per day x number of years you smoked); (5) You get a written order from a healthcare provider.  Glaucoma Screening: covered annually if you're considered high risk: (1) You have diabetes OR (2) Family history of glaucoma OR (3)  aged 50 and older OR (4)  American aged 65 and older  Osteoporosis Screening: covered every 2 years if you meet one of the following conditions: (1) Have a vertebral abnormality; (2) On glucocorticoid therapy for more than 3 months; (3) Have primary hyperparathyroidism; (4) On osteoporosis medications and need to assess response to drug therapy.  HIV Screening: covered annually if you're between the age of 15-65. Also covered annually if you are younger than 15 and older than 65 with risk factors for HIV infection. For pregnant patients, it is covered up to 3 times per pregnancy.    Immunizations:  Immunization Recommendations   Influenza Vaccine Annual influenza vaccination during flu season is recommended for all persons aged >= 6 months who do not have contraindications   Pneumococcal Vaccine   * Pneumococcal conjugate vaccine = PCV13 (Prevnar 13), PCV15 (Vaxneuvance), PCV20 (Prevnar 20)  * Pneumococcal polysaccharide vaccine =  PPSV23 (Pneumovax) Adults 19-65 yo with certain risk factors or if 65+ yo  If never received any pneumonia vaccine: recommend Prevnar 20 (PCV20)  Give PCV20 if previously received 1 dose of PCV13 or PPSV23   Hepatitis B Vaccine 3 dose series if at intermediate or high risk (ex: diabetes, end stage renal disease, liver disease)   Respiratory syncytial virus (RSV) Vaccine - COVERED BY MEDICARE PART D  * RSVPreF3 (Arexvy) CDC recommends that adults 60 years of age and older may receive a single dose of RSV vaccine using shared clinical decision-making (SCDM)   Tetanus (Td) Vaccine - COST NOT COVERED BY MEDICARE PART B Following completion of primary series, a booster dose should be given every 10 years to maintain immunity against tetanus. Td may also be given as tetanus wound prophylaxis.   Tdap Vaccine - COST NOT COVERED BY MEDICARE PART B Recommended at least once for all adults. For pregnant patients, recommended with each pregnancy.   Shingles Vaccine (Shingrix) - COST NOT COVERED BY MEDICARE PART B  2 shot series recommended in those 19 years and older who have or will have weakened immune systems or those 50 years and older     Health Maintenance Due:      Topic Date Due   • Hepatitis C Screening  Never done   • HIV Screening  Never done     Immunizations Due:      Topic Date Due   • HPV Vaccine (1 - Male 3-dose series) Never done   • COVID-19 Vaccine (3 - 2024-25 season) 09/01/2024     Advance Directives   What are advance directives?  Advance directives are legal documents that state your wishes and plans for medical care. These plans are made ahead of time in case you lose your ability to make decisions for yourself. Advance directives can apply to any medical decision, such as the treatments you want, and if you want to donate organs.   What are the types of advance directives?  There are many types of advance directives, and each state has rules about how to use them. You may choose a combination of any of  the following:  Living will:  This is a written record of the treatment you want. You can also choose which treatments you do not want, which to limit, and which to stop at a certain time. This includes surgery, medicine, IV fluid, and tube feedings.   Durable power of  for healthcare (DPAHC):  This is a written record that states who you want to make healthcare choices for you when you are unable to make them for yourself. This person, called a proxy, is usually a family member or a friend. You may choose more than 1 proxy.  Do not resuscitate (DNR) order:  A DNR order is used in case your heart stops beating or you stop breathing. It is a request not to have certain forms of treatment, such as CPR. A DNR order may be included in other types of advance directives.  Medical directive:  This covers the care that you want if you are in a coma, near death, or unable to make decisions for yourself. You can list the treatments you want for each condition. Treatment may include pain medicine, surgery, blood transfusions, dialysis, IV or tube feedings, and a ventilator (breathing machine).  Values history:  This document has questions about your views, beliefs, and how you feel and think about life. This information can help others choose the care that you would choose.  Why are advance directives important?  An advance directive helps you control your care. Although spoken wishes may be used, it is better to have your wishes written down. Spoken wishes can be misunderstood, or not followed. Treatments may be given even if you do not want them. An advance directive may make it easier for your family to make difficult choices about your care.   Weight Management   Why it is important to manage your weight:  Being overweight increases your risk of health conditions such as heart disease, high blood pressure, type 2 diabetes, and certain types of cancer. It can also increase your risk for osteoarthritis, sleep apnea,  and other respiratory problems. Aim for a slow, steady weight loss. Even a small amount of weight loss can lower your risk of health problems.  How to lose weight safely:  A safe and healthy way to lose weight is to eat fewer calories and get regular exercise. You can lose up about 1 pound a week by decreasing the number of calories you eat by 500 calories each day.   Healthy meal plan for weight management:  A healthy meal plan includes a variety of foods, contains fewer calories, and helps you stay healthy. A healthy meal plan includes the following:  Eat whole-grain foods more often.  A healthy meal plan should contain fiber. Fiber is the part of grains, fruits, and vegetables that is not broken down by your body. Whole-grain foods are healthy and provide extra fiber in your diet. Some examples of whole-grain foods are whole-wheat breads and pastas, oatmeal, brown rice, and bulgur.  Eat a variety of vegetables every day.  Include dark, leafy greens such as spinach, kale, bon greens, and mustard greens. Eat yellow and orange vegetables such as carrots, sweet potatoes, and winter squash.   Eat a variety of fruits every day.  Choose fresh or canned fruit (canned in its own juice or light syrup) instead of juice. Fruit juice has very little or no fiber.  Eat low-fat dairy foods.  Drink fat-free (skim) milk or 1% milk. Eat fat-free yogurt and low-fat cottage cheese. Try low-fat cheeses such as mozzarella and other reduced-fat cheeses.  Choose meat and other protein foods that are low in fat.  Choose beans or other legumes such as split peas or lentils. Choose fish, skinless poultry (chicken or turkey), or lean cuts of red meat (beef or pork). Before you cook meat or poultry, cut off any visible fat.   Use less fat and oil.  Try baking foods instead of frying them. Add less fat, such as margarine, sour cream, regular salad dressing and mayonnaise to foods. Eat fewer high-fat foods. Some examples of high-fat foods  include french fries, doughnuts, ice cream, and cakes.  Eat fewer sweets.  Limit foods and drinks that are high in sugar. This includes candy, cookies, regular soda, and sweetened drinks.  Exercise:  Exercise at least 30 minutes per day on most days of the week. Some examples of exercise include walking, biking, dancing, and swimming. You can also fit in more physical activity by taking the stairs instead of the elevator or parking farther away from stores. Ask your healthcare provider about the best exercise plan for you.   Narcotic (Opioid) Safety    Use narcotics safely:  Take prescribed narcotics exactly as directed  Do not give narcotics to others or take narcotics that belong to someone else  Do not mix narcotics without medicines or alcohol  Do not drive or operate heavy machinery after you take the narcotic  Monitor for side effects and notify your healthcare provider if you experienced side effects such as nausea, sleepiness, itching, or trouble thinking clearly.    Manage constipation:    Constipation is the most common side effect of narcotic medicine. Constipation is when you have hard, dry bowel movements, or you go longer than usual between bowel movements. Tell your healthcare provider about all changes in your bowel movements while you are taking narcotics. He or she may recommend laxative medicine to help you have a bowel movement. He or she may also change the kind of narcotic you are taking, or change when you take it. The following are more ways you can prevent or relieve constipation:    Drink liquids as directed.  You may need to drink extra liquids to help soften and move your bowels. Ask how much liquid to drink each day and which liquids are best for you.  Eat high-fiber foods.  This may help decrease constipation by adding bulk to your bowel movements. High-fiber foods include fruits, vegetables, whole-grain breads and cereals, and beans. Your healthcare provider or dietitian can help you  create a high-fiber meal plan. Your provider may also recommend a fiber supplement if you cannot get enough fiber from food.  Exercise regularly.  Regular physical activity can help stimulate your intestines. Walking is a good exercise to prevent or relieve constipation. Ask which exercises are best for you.  Schedule a time each day to have a bowel movement.  This may help train your body to have regular bowel movements. Bend forward while you are on the toilet to help move the bowel movement out. Sit on the toilet for at least 10 minutes, even if you do not have a bowel movement.    Store narcotics safely:   Store narcotics where others cannot easily get them.  Keep them in a locked cabinet or secure area. Do not  keep them in a purse or other bag you carry with you. A person may be looking for something else and find the narcotics.  Make sure narcotics are stored out of the reach of children.  A child can easily overdose on narcotics. Narcotics may look like candy to a small child.    The best way to dispose of narcotics:      The laws vary by country and area. In the [place], the best way is to return the narcotics through a take-back program. This program is offered by the [place] Drug Enforcement Agency (ZAFAR). The following are options for using the program:  Take the narcotics to a ZAFAR collection site.  The site is often a law enforcement center. Call your local law enforcement center for scheduled take-back days in your area. You will be given information on where to go if the collection site is in a different location.  Take the narcotics to an approved pharmacy or hospital.  A pharmacy or hospital may be set up as a collection site. You will need to ask if it is a ZAFAR collection site if you were not directed there. A pharmacy or doctor's office may not be able to take back narcotics unless it is a ZAFAR site.  Use a mail-back system.  This means you are given containers to put the narcotics into. You will  then mail them in the containers.  Use a take-back drop box.  This is a place to leave the narcotics at any time. People and animals will not be able to get into the box. Your local law enforcement agency can tell you where to find a drop box in your area.    Other ways to manage pain:   Ask your healthcare provider about non-narcotic medicines to control pain.  Nonprescription medicines include NSAIDs (such as ibuprofen) and acetaminophen. Prescription medicines include muscle relaxers, antidepressants, and steroids.  Pain may be managed without any medicines.  Some ways to relieve pain include massage, aromatherapy, or meditation. Physical or occupational therapy may also help.    For more information:   Drug Enforcement Administration  [address] [place] [address]  Phone: 4- 303 - 707-0581  Web Address: https://www.deadiversion.[facility].gov/drug_disposal/     Food and Drug Administration  [address] [place] [address]  Phone: 7- 313 - 922-0018  Web Address: http://www.fda.gov   © Copyright [employer] 2018 Information is for End User's use only and may not be sold, redistributed or otherwise used for commercial purposes. All illustrations and images included in CareNotes® are the copyrighted property of A.D.A.M., Inc. or [employer]

## 2025-05-20 PROBLEM — Z30.2 ENCOUNTER FOR STERILIZATION: Status: ACTIVE | Noted: 2025-05-20

## 2025-05-21 ENCOUNTER — OFFICE VISIT (OUTPATIENT)
Dept: UROLOGY | Facility: CLINIC | Age: 40
End: 2025-05-21
Payer: MEDICARE

## 2025-05-21 VITALS
OXYGEN SATURATION: 97 % | DIASTOLIC BLOOD PRESSURE: 78 MMHG | WEIGHT: 181 LBS | BODY MASS INDEX: 25.34 KG/M2 | HEART RATE: 61 BPM | SYSTOLIC BLOOD PRESSURE: 100 MMHG | TEMPERATURE: 97.3 F | HEIGHT: 71 IN

## 2025-05-21 DIAGNOSIS — Z30.2 ENCOUNTER FOR STERILIZATION: Primary | ICD-10-CM

## 2025-05-21 PROCEDURE — 99203 OFFICE O/P NEW LOW 30 MIN: CPT | Performed by: UROLOGY

## 2025-05-21 NOTE — PROGRESS NOTES
UROLOGY PROGRESS NOTE         NAME: Jagdish Wheeler  AGE: 39 y.o. SEX: male  : 1985   MRN: [id number]    DATE: 2025  TIME: 9:36 AM    Assessment and Plan      Impression:   1. Encounter for sterilization       Plan: Patient is interested in proceeding with vasectomy but will discuss this with his fiancée first.  Due to physical findings noted, patient should have vasectomy under anesthesia as an outpatient if he chooses to proceed.  He has 8 children and will be a grandfather later this year.  The vasectomy procedure is risks limitations permanency were discussed in detail today.  Informed consent was obtained.  Risks including but not limited to bleeding, infection, failure to sterilize and the need for additional procedures, chronic pain, sperm granuloma, epididymitis, were discussed and understood.  His questions were answered.  On exam he had significant discomfort on the left side and the left vas was more difficult to access.  Due to these findings, I would recommend he have his vasectomy under IV sedation or general anesthesia.  This would be in the patient's best interest.  He would not be a good candidate to have this done in the office under local.      Chief Complaint     Chief Complaint   Patient presents with   • Vasectomy     History of Present Illness     HPI: Jagdish Wheeler is a 39 y.o. year old male who presents with desire for elective sterilization              The following portions of the patient's history were reviewed and updated as appropriate: allergies, current medications, past family history, past medical history, past social history, past surgical history and problem list.  Past Medical History:   Diagnosis Date   • Anxiety    • Bipolar 1 disorder (HCC)    • Depression    • Frequent headaches    • Headache(784.0)    • Psychiatric disorder    • Shingles      No past surgical history on file.  shoulder  Review of Systems     Const: Denies chills, fever and weight  "loss.  CV: Denies chest pain.  Resp: Denies SOB.  GI: Denies abdominal pain, nausea and vomiting.  : Denies symptoms other than stated above.  Musculo: Denies back pain.    Objective   /78   Pulse 61   Temp (!) 97.3 °F (36.3 °C)   Ht 5' 11\" (1.803 m)   Wt 82.1 kg (181 lb)   SpO2 97%   BMI 25.24 kg/m²     Physical Exam  Const: Appears healthy and well developed. No signs of acute distress present.  Resp: Respirations are regular and unlabored.   CV: Rate is regular. Rhythm is regular.  Abdomen: Abdomen is soft, nontender, and nondistended. Kidneys are not palpable.  : Phallus uncircumcised no lesions.  Foreskin retractile.  Testes descended and normal.  Epididymis normal on each side.  Left vas deferens is difficult to access and patient has pain with exam on that side with some vasovagal response.  Right vas is normal and accessible.  No hernias on either side.  Psych: Patient's attitude is cooperative. Mood is normal. Affect is normal.    Procedure   Procedures     Current Medications   Current Medications[1]        Meagan Wheatley MD               [1]    Current Outpatient Medications:   •  cephalexin (KEFLEX) 500 mg capsule, , Disp: , Rfl:   •  naproxen (NAPROSYN) 500 mg tablet, Take 1 tablet (500 mg total) by mouth 2 (two) times a day as needed for mild pain or moderate pain, Disp: 30 tablet, Rfl: 0  •  oxyCODONE-acetaminophen (PERCOCET) 5-325 mg per tablet, , Disp: , Rfl: "

## 2025-07-01 ENCOUNTER — TELEPHONE (OUTPATIENT)
Dept: UROLOGY | Facility: CLINIC | Age: 40
End: 2025-07-01

## 2025-07-14 DIAGNOSIS — Z30.2 ENCOUNTER FOR STERILIZATION: Primary | ICD-10-CM

## 2025-07-15 RX ORDER — DIAZEPAM 10 MG/1
10 TABLET ORAL ONCE
Qty: 1 TABLET | Refills: 0 | Status: SHIPPED | OUTPATIENT
Start: 2025-07-15 | End: 2025-07-21

## 2025-07-21 ENCOUNTER — TELEPHONE (OUTPATIENT)
Dept: UROLOGY | Facility: CLINIC | Age: 40
End: 2025-07-21

## 2025-07-21 DIAGNOSIS — Z30.2 ENCOUNTER FOR STERILIZATION: ICD-10-CM

## 2025-07-21 RX ORDER — DIAZEPAM 10 MG/1
10 TABLET ORAL ONCE
Qty: 1 TABLET | Refills: 0 | Status: CANCELLED | OUTPATIENT
Start: 2025-07-21 | End: 2025-07-21

## 2025-07-21 NOTE — TELEPHONE ENCOUNTER
Patient would like medication sent to [facility] on [facility]. Thank you.

## 2025-07-21 NOTE — TELEPHONE ENCOUNTER
Left voicemail for patient to call office to go over his instructions for his procedure tomorrow.

## 2025-07-21 NOTE — TELEPHONE ENCOUNTER
Patient is not a candidate to have vasectomy in the office.  Please see my office consultation note.  We will schedule this to be done at the hospital under anesthesia.  Patient notified.

## 2025-07-28 ENCOUNTER — TELEPHONE (OUTPATIENT)
Dept: UROLOGY | Facility: CLINIC | Age: 40
End: 2025-07-28